# Patient Record
Sex: MALE
[De-identification: names, ages, dates, MRNs, and addresses within clinical notes are randomized per-mention and may not be internally consistent; named-entity substitution may affect disease eponyms.]

---

## 2023-05-17 ENCOUNTER — APPOINTMENT (OUTPATIENT)
Dept: OTOLARYNGOLOGY | Facility: CLINIC | Age: 18
End: 2023-05-17
Payer: COMMERCIAL

## 2023-05-17 VITALS — WEIGHT: 233 LBS | BODY MASS INDEX: 35.31 KG/M2 | HEIGHT: 68 IN

## 2023-05-17 DIAGNOSIS — J35.1 HYPERTROPHY OF TONSILS: ICD-10-CM

## 2023-05-17 PROBLEM — Z00.00 ENCOUNTER FOR PREVENTIVE HEALTH EXAMINATION: Status: ACTIVE | Noted: 2023-05-17

## 2023-05-17 PROCEDURE — 99203 OFFICE O/P NEW LOW 30 MIN: CPT

## 2023-05-17 NOTE — PHYSICAL EXAM
[Normal] : no abnormal secretions [Removed] : palatine tonsils previously removed [de-identified] : 3-4+ tonsils

## 2023-05-17 NOTE — HISTORY OF PRESENT ILLNESS
[de-identified] : Patient presents today c/o enlarged tonsil . Last week seen by PCP , told that tonsil are enlarged . He  denies any trouble with swallowing . He  occasionally snores at night .  He denies any mouth breathing , able to breathe from nose well . No history of throat infections.  [Snoring] : snoring [Witnessed Apneas] : no witnessed sleep apnea [Frequent Nocturnal Awakening] : no nocturnal awakening [Daytime Somnolence] : no daytime somnolence [Unintentional Sleep while Active] : no unintentional sleep while active [Awakes Unrefreshed] : does not awake unrefreshed [Awakening With Dry Mouth] : no dry mouth upon awakening